# Patient Record
Sex: FEMALE | Race: WHITE
[De-identification: names, ages, dates, MRNs, and addresses within clinical notes are randomized per-mention and may not be internally consistent; named-entity substitution may affect disease eponyms.]

---

## 2018-07-06 ENCOUNTER — HOSPITAL ENCOUNTER (EMERGENCY)
Dept: HOSPITAL 47 - EC | Age: 33
Discharge: HOME | End: 2018-07-06
Payer: COMMERCIAL

## 2018-07-06 VITALS — DIASTOLIC BLOOD PRESSURE: 81 MMHG | SYSTOLIC BLOOD PRESSURE: 127 MMHG | TEMPERATURE: 98.7 F | HEART RATE: 81 BPM

## 2018-07-06 VITALS — RESPIRATION RATE: 18 BRPM

## 2018-07-06 DIAGNOSIS — Z88.0: ICD-10-CM

## 2018-07-06 DIAGNOSIS — R20.9: ICD-10-CM

## 2018-07-06 DIAGNOSIS — R07.89: Primary | ICD-10-CM

## 2018-07-06 DIAGNOSIS — Z88.1: ICD-10-CM

## 2018-07-06 DIAGNOSIS — Z82.49: ICD-10-CM

## 2018-07-06 LAB
ALBUMIN SERPL-MCNC: 4.3 G/DL (ref 3.5–5)
ALP SERPL-CCNC: 54 U/L (ref 38–126)
ALT SERPL-CCNC: 43 U/L (ref 9–52)
AMYLASE SERPL-CCNC: 51 U/L (ref 30–110)
ANION GAP SERPL CALC-SCNC: 11 MMOL/L
APTT BLD: 24.9 SEC (ref 22–30)
AST SERPL-CCNC: 22 U/L (ref 14–36)
BASOPHILS # BLD AUTO: 0 K/UL (ref 0–0.2)
BASOPHILS NFR BLD AUTO: 1 %
BUN SERPL-SCNC: 12 MG/DL (ref 7–17)
CALCIUM SPEC-MCNC: 9.5 MG/DL (ref 8.4–10.2)
CHLORIDE SERPL-SCNC: 105 MMOL/L (ref 98–107)
CO2 SERPL-SCNC: 25 MMOL/L (ref 22–30)
EOSINOPHIL # BLD AUTO: 0.1 K/UL (ref 0–0.7)
EOSINOPHIL NFR BLD AUTO: 2 %
ERYTHROCYTE [DISTWIDTH] IN BLOOD BY AUTOMATED COUNT: 5.22 M/UL (ref 3.8–5.4)
ERYTHROCYTE [DISTWIDTH] IN BLOOD: 12.7 % (ref 11.5–15.5)
GLUCOSE SERPL-MCNC: 96 MG/DL (ref 74–99)
HCT VFR BLD AUTO: 44.2 % (ref 34–46)
HGB BLD-MCNC: 14.9 GM/DL (ref 11.4–16)
INR PPP: 1.1 (ref ?–1.2)
LIPASE SERPL-CCNC: 59 U/L (ref 23–300)
LYMPHOCYTES # SPEC AUTO: 1.9 K/UL (ref 1–4.8)
LYMPHOCYTES NFR SPEC AUTO: 30 %
MAGNESIUM SPEC-SCNC: 2.2 MG/DL (ref 1.6–2.3)
MCH RBC QN AUTO: 28.6 PG (ref 25–35)
MCHC RBC AUTO-ENTMCNC: 33.7 G/DL (ref 31–37)
MCV RBC AUTO: 84.8 FL (ref 80–100)
MONOCYTES # BLD AUTO: 0.4 K/UL (ref 0–1)
MONOCYTES NFR BLD AUTO: 6 %
NEUTROPHILS # BLD AUTO: 3.8 K/UL (ref 1.3–7.7)
NEUTROPHILS NFR BLD AUTO: 60 %
PLATELET # BLD AUTO: 241 K/UL (ref 150–450)
POTASSIUM SERPL-SCNC: 4 MMOL/L (ref 3.5–5.1)
PROT SERPL-MCNC: 7 G/DL (ref 6.3–8.2)
PT BLD: 10.5 SEC (ref 9–12)
SODIUM SERPL-SCNC: 141 MMOL/L (ref 137–145)
WBC # BLD AUTO: 6.4 K/UL (ref 3.8–10.6)

## 2018-07-06 PROCEDURE — 99285 EMERGENCY DEPT VISIT HI MDM: CPT

## 2018-07-06 PROCEDURE — 85025 COMPLETE CBC W/AUTO DIFF WBC: CPT

## 2018-07-06 PROCEDURE — 93005 ELECTROCARDIOGRAM TRACING: CPT

## 2018-07-06 PROCEDURE — 83690 ASSAY OF LIPASE: CPT

## 2018-07-06 PROCEDURE — 71046 X-RAY EXAM CHEST 2 VIEWS: CPT

## 2018-07-06 PROCEDURE — 84484 ASSAY OF TROPONIN QUANT: CPT

## 2018-07-06 PROCEDURE — 83735 ASSAY OF MAGNESIUM: CPT

## 2018-07-06 PROCEDURE — 82150 ASSAY OF AMYLASE: CPT

## 2018-07-06 PROCEDURE — 85730 THROMBOPLASTIN TIME PARTIAL: CPT

## 2018-07-06 PROCEDURE — 36415 COLL VENOUS BLD VENIPUNCTURE: CPT

## 2018-07-06 PROCEDURE — 80053 COMPREHEN METABOLIC PANEL: CPT

## 2018-07-06 PROCEDURE — 85610 PROTHROMBIN TIME: CPT

## 2018-07-06 NOTE — ED
Chest Pain HPI





- General


Chief Complaint: Chest Pain


Stated Complaint: Chest pain


Time Seen by Provider: 07/06/18 21:39


Source: patient, RN notes reviewed


Mode of arrival: ambulatory


Limitations: no limitations





- History of Present Illness


Initial Comments: 


This is a 32-year-old female who presents to the emergency department with 

chief complaint of left upper chest pain.  Patient states that she developed 

chest pain at 11 AM this morning.  She states that it has been intermittent and 

sharp in nature.  She states that the pain came on while driving.  She denies 

shortness of breath, nausea or vomiting, abdominal pain, fevers or chills, 

cough.  Patient states that she became more concerned because she normally has 

a bowel movement every other day but had 2 bowel movements today.  She also 

reports feeling a strange sensation along the lateral aspect of her left leg.  

Patient is a nonsmoker.  She does not take birth control.  She denies any 

recent surgeries or hospitalizations.  She denies any significant medical 

history.  She does report a family history of cardiac disease, however denies 

any early onset of disease. 





- Related Data


 Home Medications











 Medication  Instructions  Recorded  Confirmed


 


Aspirin EC [Ecotrin Low Dose] 81 mg PO DAILY PRN 07/06/18 07/06/18


 


Ibuprofen [Motrin] 400 mg PO DAILY PRN 07/06/18 07/06/18


 


Loratadine [Claritin] 10 mg PO DAILY PRN 07/06/18 07/06/18











 Allergies











Allergy/AdvReac Type Severity Reaction Status Date / Time


 


cephalexin [From Keflex] Allergy  Rash/Hives Verified 07/06/18 22:35


 


Penicillins Allergy  Swelling Verified 07/06/18 22:35














Review of Systems


ROS Statement: 


Those systems with pertinent positive or pertinent negative responses have been 

documented in the HPI.





ROS Other: All systems not noted in ROS Statement are negative.





EKG Findings





- EKG Comments:


EKG Findings:: 21:38:49.  Normal sinus rhythm with sinus arrhythmia.  

Ventricular rate 81 bpm, CO interval 140, QRS duration 80, QT//446.  No 

evidence of ST segment elevation or depression.





Past Medical History


Past Medical History: No Reported History


History of Any Multi-Drug Resistant Organisms: None Reported


Past Surgical History: Cholecystectomy


Past Psychological History: No Psychological Hx Reported


Smoking Status: Never smoker


Past Alcohol Use History: Rare


Past Drug Use History: None Reported





General Exam


Limitations: no limitations





Course


 Vital Signs











  07/06/18 07/06/18





  21:19 21:53


 


Temperature 98.5 F 


 


Pulse Rate 87 


 


Pulse Rate [  76





Bilateral  





Sitting Radial]  


 


Respiratory 18 





Rate  


 


Blood Pressure 167/91 


 


O2 Sat by Pulse 100 





Oximetry  














Chest Pain MDM





- MDM


This is a 32-year-old female who presents to the emergency department with 

chief complaint of chest pain.  Patient describes the chest pain as located in 

the left upper area of her chest under her clavicle.  She states that it is 

intermittent and sharp in nature.  PERC is negative, low likelihood for DVT or 

pulmonary embolism.  Patient is a nonsmoker.  She denies early onset of cardiac 

disease in her family.  EKG revealed normal sinus rhythm.  Chest x-ray revealed 

no acute abnormalities.  CBC, CMP and coags were within normal limits.  

Troponin was negative.  The patient's vital signs are stable and she is in no 

acute distress.  She will be discharged home at this time.  Patient is to follow

-up with her primary care provider.  She is in agreement with plan and voices 

understanding.  All questions answered.





Chest x-ray impression: Normal chest.








Disposition


Clinical Impression: 


 Atypical chest pain





Disposition: HOME SELF-CARE


Condition: Good


Instructions:  Chest Pain (ED)


Additional Instructions: 


Please follow up with primary care provider within 1-2 days. Return to 

emergency department if symptoms should worsen or any concerns arise. 


Is patient prescribed a controlled substance at d/c from ED?: No


Referrals: 


Lucero Long MD [Primary Care Provider] - 1-2 days


Time of Disposition: 23:30

## 2018-07-06 NOTE — XR
EXAMINATION TYPE: XR chest 2V

 

DATE OF EXAM: 7/6/2018

 

COMPARISON: NONE

 

HISTORY: Chest pain

 

TECHNIQUE:  Frontal and lateral views of the chest are obtained.

 

FINDINGS:  Heart and mediastinum are normal. Lungs are clear. Diaphragm is normal. Bony thorax is int
act.

 

IMPRESSION:  Normal chest.

## 2022-10-29 ENCOUNTER — HOSPITAL ENCOUNTER (EMERGENCY)
Dept: HOSPITAL 47 - EC | Age: 37
Discharge: HOME | End: 2022-10-29
Payer: COMMERCIAL

## 2022-10-29 VITALS
DIASTOLIC BLOOD PRESSURE: 78 MMHG | HEART RATE: 78 BPM | SYSTOLIC BLOOD PRESSURE: 138 MMHG | TEMPERATURE: 98.6 F | RESPIRATION RATE: 18 BRPM

## 2022-10-29 DIAGNOSIS — Z88.0: ICD-10-CM

## 2022-10-29 DIAGNOSIS — Z88.1: ICD-10-CM

## 2022-10-29 DIAGNOSIS — U07.1: Primary | ICD-10-CM

## 2022-10-29 PROCEDURE — 99283 EMERGENCY DEPT VISIT LOW MDM: CPT

## 2022-10-29 PROCEDURE — 87636 SARSCOV2 & INF A&B AMP PRB: CPT

## 2022-10-29 NOTE — ED
General Adult HPI





- General


Chief complaint: Upper Respiratory Infection


Stated complaint: Sore throat, Cough, Covid exposure


Time Seen by Provider: 10/29/22 07:09


Source: patient


Mode of arrival: ambulatory


Limitations: no limitations





- History of Present Illness


Initial comments: 


Dictation was produced using dragon dictation software. please excuse any 

grammatical, word or spelling errors. 











Chief Complaint: 36-year-old female presents emergency Department requesting 

COVID-19 testing.





History of Present Illness: 36-year-old female presents emergency Department 

with 4 days of COVID-19 symptoms.  Her father was tested here in emergency 

department yesterday and was positive.  Patient states that her symptoms are 

very mild.  She is unvaccinated for COVID-19.  Patient states that she has 

symptoms of aches, congestion and nonproductive cough.








The ROS documented in this emergency department record has been reviewed and 

confirmed by me.  Those systems with pertinent positive or negative responses 

have been documented in the HPI.  All other systems are other negative and/or 

noncontributory.








PHYSICAL EXAM:


General Impression: Alert and oriented x3, not in acute distress


HEENT: Normocephalic atraumatic, extra-ocular movements intact, pupils equal and

reactive to light bilaterally, mucous membranes moist.


Cardiovascular: Heart regular rate and rhythm


Chest: Able to complete full sentences, no retractions, no tachypnea


Musculoskeletal:  no peripheral edema


Motor:  no focal deficits noted


Neurological: CN II-XII grossly intact, no focal motor or sensory deficits noted


Skin: Intact with no visualized rashes


Psych: Normal affect and mood





ED course: 36-year-old female presents emergency department for Covid 19 testing

.  Vital signs upon arrival are within acceptable limits.  Patient not showing 

signs of respiratory distress.  Patient not hypoxic.


Patient is COVID-19 positive.  Patient does not meet criteria for antiviral 

administration.  Patient be discharged.

















- Related Data


                                Home Medications











 Medication  Instructions  Recorded  Confirmed


 


Aspirin EC [Ecotrin Low Dose] 81 mg PO DAILY PRN 07/06/18 07/06/18


 


Ibuprofen [Motrin] 400 mg PO DAILY PRN 07/06/18 07/06/18


 


Loratadine [Claritin] 10 mg PO DAILY PRN 07/06/18 07/06/18











                                    Allergies











Allergy/AdvReac Type Severity Reaction Status Date / Time


 


cephalexin [From Keflex] Allergy  Rash/Hives Verified 10/29/22 07:21


 


Penicillins Allergy  Swelling Verified 10/29/22 07:21














Review of Systems


ROS Statement: 


Those systems with pertinent positive or pertinent negative responses have been 

documented in the HPI.





ROS Other: All systems not noted in ROS Statement are negative.





Past Medical History


Past Medical History: No Reported History


History of Any Multi-Drug Resistant Organisms: None Reported


Past Surgical History: Cholecystectomy


Past Psychological History: No Psychological Hx Reported


Past Alcohol Use History: Rare


Past Drug Use History: None Reported





General Exam


Limitations: no limitations





Course


                                   Vital Signs











  10/29/22





  07:21


 


Temperature 98.3 F


 


Pulse Rate 92


 


Respiratory 16





Rate 


 


Blood Pressure 140/99


 


O2 Sat by Pulse 99





Oximetry 














Medical Decision Making





- Lab Data


                                   Lab Results











  10/29/22 Range/Units





  07:25 


 


Influenza Type A (PCR)  Not Detected  (Not Detectd)  


 


Influenza Type B (PCR)  Not Detected  (Not Detectd)  


 


RSV (PCR)  Not Detected  (Not Detectd)  


 


SARS-CoV-2 (PCR)  Detected A  (Not Detectd)  














Disposition


Clinical Impression: 


 Coronavirus infection





Disposition: HOME SELF-CARE


Condition: Good


Instructions (If sedation given, give patient instructions):  Coronavirus 

Disease 2019 (COVID-19)


Is patient prescribed a controlled substance at d/c from ED?: No


Referrals: 


Lucero Long MD [Primary Care Provider] - 1-2 days


Time of Disposition: 09:06

## 2024-10-31 ENCOUNTER — HOSPITAL ENCOUNTER (EMERGENCY)
Dept: HOSPITAL 47 - EC | Age: 39
Discharge: HOME | End: 2024-10-31
Payer: COMMERCIAL

## 2024-10-31 VITALS — DIASTOLIC BLOOD PRESSURE: 90 MMHG | HEART RATE: 75 BPM | SYSTOLIC BLOOD PRESSURE: 128 MMHG | TEMPERATURE: 98.1 F

## 2024-10-31 VITALS — RESPIRATION RATE: 18 BRPM

## 2024-10-31 DIAGNOSIS — Z88.1: ICD-10-CM

## 2024-10-31 DIAGNOSIS — Z90.49: ICD-10-CM

## 2024-10-31 DIAGNOSIS — Z88.0: ICD-10-CM

## 2024-10-31 DIAGNOSIS — K59.00: Primary | ICD-10-CM

## 2024-10-31 LAB
ALBUMIN SERPL-MCNC: 4.6 G/DL (ref 3.5–5)
ALP SERPL-CCNC: 52 U/L (ref 38–126)
ALT SERPL-CCNC: 16 U/L (ref 4–34)
AMYLASE SERPL-CCNC: 57 U/L (ref 30–110)
ANION GAP SERPL CALC-SCNC: 8 MMOL/L
AST SERPL-CCNC: 21 U/L (ref 14–36)
BASOPHILS # BLD AUTO: 0.1 K/UL (ref 0–0.2)
BASOPHILS NFR BLD AUTO: 1 %
BUN SERPL-SCNC: 10 MG/DL (ref 7–17)
CALCIUM SPEC-MCNC: 9.7 MG/DL (ref 8.4–10.2)
CHLORIDE SERPL-SCNC: 108 MMOL/L (ref 98–107)
CO2 SERPL-SCNC: 23 MMOL/L (ref 22–30)
EOSINOPHIL # BLD AUTO: 0.1 K/UL (ref 0–0.7)
EOSINOPHIL NFR BLD AUTO: 2 %
ERYTHROCYTE [DISTWIDTH] IN BLOOD BY AUTOMATED COUNT: 5.16 M/UL (ref 3.8–5.4)
ERYTHROCYTE [DISTWIDTH] IN BLOOD: 12.5 % (ref 11.5–15.5)
GLUCOSE SERPL-MCNC: 95 MG/DL (ref 74–99)
HCT VFR BLD AUTO: 44.3 % (ref 34–46)
HGB BLD-MCNC: 14.4 GM/DL (ref 11.4–16)
LIPASE SERPL-CCNC: 81 U/L (ref 23–300)
LYMPHOCYTES # SPEC AUTO: 2.8 K/UL (ref 1–4.8)
LYMPHOCYTES NFR SPEC AUTO: 36 %
MCH RBC QN AUTO: 27.9 PG (ref 25–35)
MCHC RBC AUTO-ENTMCNC: 32.6 G/DL (ref 31–37)
MCV RBC AUTO: 85.8 FL (ref 80–100)
MONOCYTES # BLD AUTO: 0.4 K/UL (ref 0–1)
MONOCYTES NFR BLD AUTO: 6 %
NEUTROPHILS # BLD AUTO: 4.2 K/UL (ref 1.3–7.7)
NEUTROPHILS NFR BLD AUTO: 54 %
PH UR: 5.5 [PH] (ref 5–8)
PLATELET # BLD AUTO: 271 K/UL (ref 150–450)
POTASSIUM SERPL-SCNC: 3.9 MMOL/L (ref 3.5–5.1)
PROT SERPL-MCNC: 7.5 G/DL (ref 6.3–8.2)
SODIUM SERPL-SCNC: 139 MMOL/L (ref 137–145)
SP GR UR: 1.02 (ref 1–1.03)
UROBILINOGEN UR QL STRIP: <2 MG/DL (ref ?–2)
WBC # BLD AUTO: 7.7 K/UL (ref 3.8–10.6)

## 2024-10-31 PROCEDURE — 85025 COMPLETE CBC W/AUTO DIFF WBC: CPT

## 2024-10-31 PROCEDURE — 83605 ASSAY OF LACTIC ACID: CPT

## 2024-10-31 PROCEDURE — 80053 COMPREHEN METABOLIC PANEL: CPT

## 2024-10-31 PROCEDURE — 93005 ELECTROCARDIOGRAM TRACING: CPT

## 2024-10-31 PROCEDURE — 81025 URINE PREGNANCY TEST: CPT

## 2024-10-31 PROCEDURE — 36415 COLL VENOUS BLD VENIPUNCTURE: CPT

## 2024-10-31 PROCEDURE — 74018 RADEX ABDOMEN 1 VIEW: CPT

## 2024-10-31 PROCEDURE — 99284 EMERGENCY DEPT VISIT MOD MDM: CPT

## 2024-10-31 PROCEDURE — 81003 URINALYSIS AUTO W/O SCOPE: CPT

## 2024-10-31 PROCEDURE — 82150 ASSAY OF AMYLASE: CPT

## 2024-10-31 PROCEDURE — 83690 ASSAY OF LIPASE: CPT

## 2024-10-31 RX ADMIN — MAGESIUM CITRATE ONE ML: 1.75 LIQUID ORAL at 23:12

## 2024-10-31 NOTE — XR
EXAMINATION TYPE: XR KUB

 

DATE OF EXAM: 10/31/2024 10:21 PM

 

CLINICAL HISTORY:  Abdominal pain. Alternating constipation and diarrhea for 3 weeks.

 

TECHNIQUE:  Two Upright KUB images of the abdomen are obtained.

 

COMPARISON: None.

 

FINDINGS: Scattered gas is seen in non-distended small and large bowel loops. Suggestion of some loss
 of normal haustration involving the left colon. Cholecystectomy clips are seen. Displaced clips late
ral right mid abdomen is noted. No free air is identified. Left-sided pelvic phlebolith is seen. Osse
ous structures are intact. Lung bases are clear.

 

IMPRESSION: 

Overall nonobstructive bowel gas pattern. Possible left-sided colitis.

 

[facility], Workstation: [id number], 10/31/2024 10:26 PM

## 2024-10-31 NOTE — ED
Abdominal Pain HPI





- General


Source: patient, RN notes reviewed


Mode of arrival: ambulatory


Limitations: no limitations





- History of Present Illness


MD Complaint: abdominal pain, flank pain


Onset/Timin


-: hour(s)


Location: RUQ, R flank


Radiation: back


Severity scale (1-10): 7


Consistency: intermittent


Associated Symptoms: diarrhea, constipation, other (Pale stool yesterday)


Treatments Prior to Arrival: other (Dicyclomine)





<Madhu Linton - Last Filed: 10/31/24 19:35>





<Charlie Silverman - Last Filed: 24 02:12>





- General


Chief Complaint: Abdominal Pain


Stated Complaint: abd pain/constipation


Time Seen by Provider: 10/31/24 18:22





- History of Present Illness


Initial Comments: 





Quick note: This is a 38-year-old female presenting with abdominal pain x 5 

hours.  Patient states pain is intermittent and described as burning/pressure, 

especially in the right upper quadrant radiating to her right flank and back.  

Patient endorses long history of GI symptoms with most recent bout of 

constipation/diarrhea for the past 3 weeks.  Patient endorses working diagnosis 

of IBS.  Patient endorses history of cholecystectomy in .  Patient states 

her stool yesterday was slightly more pale in appearance.  Patient denies 

history of gallstones or kidney stones.  Patient denies fever, chills, chest 

pain, dyspnea, urinary symptoms, nausea/vomiting, dizziness, hematochezia, 

melena, hematuria. (Madhu Linton)





Agree with above, patient has been having milder symptoms going back between 1 

and 2 weeks.  She states that the pain became more noticeable yesterday and she 

went to urgent care where they checked her urine, she has paperwork showing no 

findings in the urine.  Patient was given dicyclomine 20 mg to take for the 

pains and she states that it really did not seem to do much. (Charlie Silverman)





- Related Data


                                Home Medications











 Medication  Instructions  Recorded  Confirmed


 


Aspirin EC [Ecotrin Low Dose] 81 mg PO DAILY PRN 18


 


Ibuprofen [Motrin] 400 mg PO DAILY PRN 18


 


Loratadine [Claritin] 10 mg PO DAILY PRN 18











                                    Allergies











Allergy/AdvReac Type Severity Reaction Status Date / Time


 


cephalexin [From Keflex] Allergy  Rash/Hives Verified 10/31/24 18:22


 


Penicillins Allergy  Swelling Verified 10/31/24 18:22


 


xylitol Allergy  Unknown Verified 10/31/24 18:23














Review of Systems


ROS Other: All systems not noted in ROS Statement are negative.





<Madhu Linton - Last Filed: 10/31/24 19:35>


ROS Other: All systems not noted in ROS Statement are negative.


Constitutional: Denies: fever, chills


Respiratory: Denies: cough, dyspnea


Cardiovascular: Denies: chest pain, palpitations, edema


Gastrointestinal: Reports: as per HPI, abdominal pain, constipation.  Denies: 

nausea, vomiting, diarrhea, melena, hematochezia


Genitourinary: Denies: dysuria, frequency, hematuria, discharge, abnormal menses


Musculoskeletal: Denies: back pain


Skin: Denies: rash


Neurological: Denies: headache, weakness, numbness





<HerreraCharlie - Last Filed: 24 02:12>


ROS Statement: 


Those systems with pertinent positive or pertinent negative responses have been 

documented in the HPI.








Past Medical History


Past Medical History: No Reported History


Additional Past Medical History / Comment(s): IBS


History of Any Multi-Drug Resistant Organisms: None Reported


Past Surgical History: Cholecystectomy


Past Psychological History: No Psychological Hx Reported


Smoking Status: Never smoker


Past Alcohol Use History: Rare


Past Drug Use History: None Reported





<Madhu Linton - Last Filed: 10/31/24 19:35>





General Exam


Limitations: no limitations





<Madhu Linton - Last Filed: 10/31/24 19:35>


Limitations: no limitations


General appearance: alert, in no apparent distress


Head exam: Present: atraumatic, normocephalic


Eye exam: Present: normal appearance.  Absent: scleral icterus, conjunctival 

injection


ENT exam: Present: normal oropharynx


Neck exam: Present: normal inspection.  Absent: tenderness, full ROM


Respiratory exam: Present: normal lung sounds bilaterally.  Absent: respiratory 

distress, wheezes, rales, rhonchi, stridor, accessory muscle use


Cardiovascular Exam: Present: regular rate, normal rhythm, normal heart sounds. 

Absent: systolic murmur, diastolic murmur, rubs, gallop


GI/Abdominal exam: Present: soft.  Absent: distended, tenderness, guarding, 

rebound, rigid, mass, pulsatile mass, hernia


Extremities exam: Present: normal inspection, normal capillary refill.  Absent: 

pedal edema, calf tenderness


Back exam: Present: normal inspection.  Absent: CVA tenderness (R), CVA 

tenderness (L)


Neurological exam: Present: alert


Skin exam: Present: warm, dry, intact, normal color.  Absent: rash





<Charlie Silverman - Last Filed: 24 02:12>





- General Exam Comments


Initial Comments: 





Visual Physical Exam





Vital signs reviewed





General: Well-appearing, nontoxic, no acute distress.


Head: Normocephalic, atraumatic


Eyes: PERRLA, EOMI


ENT: Airway patent


Chest: Nonlabored breathing


Skin: No visual rash, normal skin tone


Neuro: Alert and oriented 3


Musculoskeletal: No gross abnormalities


 (Madhu Linton)





Course


                                   Vital Signs











  10/31/24 10/31/24





  18:23 23:16


 


Temperature 98.5 F 98.1 F


 


Pulse Rate 91 75


 


Respiratory 18 18





Rate  


 


Blood Pressure 154/97 128/90


 


O2 Sat by Pulse 100 98





Oximetry  














Medical Decision Making





<Madhu Linton - Last Filed: 10/31/24 19:35>





- Lab Data


Result diagrams: 


                                 10/31/24 20:09





                                 10/31/24 20:09





- EKG Data


-: EKG Interpreted by Me


EKG shows normal: sinus rhythm (With sinus arrhythmia), axis (Normal), intervals

(Normal), QRS complexes (Normal), ST-T waves (Normal)


Rate: normal (Rate 76 bpm)


Interpretation: normal EKG





<Charlie Silverman - Last Filed: 24 02:12>





- Medical Decision Making





I completed the quick note portion of this chart signed MANJULA Dela Cruz (aMdhu Linton)





The patient had KUB x-ray that I interpreted as negative for free air or 

obstruction.  There may be element of localized constipation.





Was pt. sent in by a medical professional or institution (ANISHA Woodard, NP, urgent 

care, hospital, or nursing home...) When possible be specific


@  -[No]


Did you speak to anyone other than the patient for history (EMS, parent, family,

 police, friend...)? What history was obtained from this source 


@  -[No]


Did you review nursing and triage notes (agree or disagree)?  Why? 


@  -[I reviewed and agree with nursing and triage notes]


Were old charts reviewed (outside hosp., previous admission, EMS record, old 

EKG, old radiological studies, urgent care reports/EKG's, nursing home records)?

Report findings 


@  -[No old charts were reviewed]


Differential Diagnosis (chest pain, altered mental status, abdominal pain women,

abdominal pain men, vaginal bleeding, weakness, fever, dyspnea, syncope, 

headache, dizziness, GI bleed, back pain, seizure, CVA, palpatations, mental 

health, musculoskeletal)? 


@  -[Differential Abdominal Pain Women:


Appendicitis, Cholecystitis, diverticulosis, ischemic bowel, pancreatitis, 

hepatitis, UTI, gastroenteritis, AAA, incarcerated hernia, bowel obstruction, 

constipation, inflammatory bowel, hepatitis, peptic ulcer disease, splenic 

infarction, perforated viscus, vulvitis, ovarian torsion, PID, kidney stone, 

placenta abruption, this is not meant to be an all-inclusive list





EKG interpreted by me (3pts min.).


@  -[As above]


X-rays interpreted by me (1pt min.).


@  -[I interpreted as above


CT interpreted by me (1pt min.).


@  -[None done]


U/S interpreted by me (1pt. min.).


@  -[None done]


What testing was considered but not performed or refused? (CT, X-rays, U/S, 

labs)? Why?


@  -[None]


What meds were considered but not given or refused? Why?


@  -[None]


Did you discuss the management of the patient with other professionals 

(professionals i.e. , PA, NP, lab, RT, psych nurse, , , 

teacher, , )? Give summary


@  -[No]


Was smoking cessation discussed for >3mins.?


@  -[No]


Was critical care preformed (if so, how long)?


@  -[No]


Were there social determinants of health that impacted care today? How? (Ho

melessness, low income, unemployed, alcoholism, drug addiction, transportation, 

low edu. Level, literacy, decrease access to med. care, senior living, rehab)?


@  -[No]


Was there de-escalation of care discussed even if they declined (Discuss DNR or 

withdrawal of care, Hospice)? DNR status


@  -[No]


What co-morbidities impacted this encounter? (DM, HTN, Smoking, COPD, CAD, 

Cancer, CVA, ARF, Chemo, Hep., AIDS, mental health diagnosis, sleep apnea, 

morbid obesity)?


@  -[None]


Was patient admitted / discharged? Hospital course, mention meds given and 

route, prescriptions, significant lab abnormalities, going to OR and other 

pertinent info.


@  -[Patient is a 38-year-old woman presenting with abdominal pain.  On exam 

there is no findings suggestive of acute surgical condition.  Discussed with 

patient that there may be element of localized constipation based on the x-ray. 

 We discussed appropriate further care and follow-up as well as return 

parameters patient understands that if she is not feeling much better within 12 

hours she should have repeat evaluation, sooner if there is any worsening.


Undiagnosed new problem with uncertain prognosis?


@  -[No]


Drug Therapy requiring intensive monitoring for toxicity (Heparin, Nitro, 

Insulin, Cardizem)?


@  -[No]


Were any procedures done?


@  -[No]


Diagnosis/symptom?


@  -[Acute abdominal pain, suspect constipation


Acute, or Chronic, or Acute on Chronic?


@  -[Acute


Uncomplicated (without systemic symptoms) or Complicated (systemic symptoms)?


@  -[Uncomplicated


Side effects of treatment?


@  -[No]


Exacerbation, Progression, or Severe Exacerbation?


@  -[No]


Poses a threat to life or bodily function? How? (Chest pain, USA, MI, pneumonia,

 PE, COPD, DKA, ARF, appy, cholecystitis, CVA, Diverticulitis, Homicidal, 

Suicidal, threat to staff... and all critical care pts)


@  -[No]





 (Charlie Silverman)





- Lab Data


                                   Lab Results











  10/31/24 10/31/24 10/31/24 Range/Units





  20:09 20:09 20:09 


 


WBC  7.7    (3.8-10.6)  k/uL


 


RBC  5.16    (3.80-5.40)  m/uL


 


Hgb  14.4    (11.4-16.0)  gm/dL


 


Hct  44.3    (34.0-46.0)  %


 


MCV  85.8    (80.0-100.0)  fL


 


MCH  27.9    (25.0-35.0)  pg


 


MCHC  32.6    (31.0-37.0)  g/dL


 


RDW  12.5    (11.5-15.5)  %


 


Plt Count  271    (150-450)  k/uL


 


MPV  7.7    


 


Neutrophils %  54    %


 


Lymphocytes %  36    %


 


Monocytes %  6    %


 


Eosinophils %  2    %


 


Basophils %  1    %


 


Neutrophils #  4.2    (1.3-7.7)  k/uL


 


Lymphocytes #  2.8    (1.0-4.8)  k/uL


 


Monocytes #  0.4    (0-1.0)  k/uL


 


Eosinophils #  0.1    (0-0.7)  k/uL


 


Basophils #  0.1    (0-0.2)  k/uL


 


Sodium   139   (137-145)  mmol/L


 


Potassium   3.9   (3.5-5.1)  mmol/L


 


Chloride   108 H   ()  mmol/L


 


Carbon Dioxide   23   (22-30)  mmol/L


 


Anion Gap   8   mmol/L


 


BUN   10   (7-17)  mg/dL


 


Creatinine   0.72   (0.52-1.04)  mg/dL


 


Est GFR (CKD-EPI)AfAm   >90   (>60 ml/min/1.73 sqM)  


 


Est GFR (CKD-EPI)NonAf   >90   (>60 ml/min/1.73 sqM)  


 


Glucose   95   (74-99)  mg/dL


 


Plasma Lactic Acid Amilcar    0.6 L  (0.7-2.0)  mmol/L


 


Calcium   9.7   (8.4-10.2)  mg/dL


 


Total Bilirubin   0.4   (0.2-1.3)  mg/dL


 


AST   21   (14-36)  U/L


 


ALT   16   (4-34)  U/L


 


Alkaline Phosphatase   52   ()  U/L


 


Total Protein   7.5   (6.3-8.2)  g/dL


 


Albumin   4.6   (3.5-5.0)  g/dL


 


Amylase   57   ()  U/L


 


Lipase   81   ()  U/L


 


Urine Color     


 


Urine Appearance     (Clear)  


 


Urine pH     (5.0-8.0)  


 


Ur Specific Gravity     (1.001-1.035)  


 


Urine Protein     (Negative)  


 


Urine Glucose (UA)     (Negative)  


 


Urine Ketones     (Negative)  


 


Urine Blood     (Negative)  


 


Urine Nitrite     (Negative)  


 


Urine Bilirubin     (Negative)  


 


Urine Urobilinogen     (<2.0)  mg/dL


 


Ur Leukocyte Esterase     (Negative)  


 


Urine HCG, Qual     (Not Detectd)  














  10/31/24 10/31/24 Range/Units





  22:06 22:06 


 


WBC    (3.8-10.6)  k/uL


 


RBC    (3.80-5.40)  m/uL


 


Hgb    (11.4-16.0)  gm/dL


 


Hct    (34.0-46.0)  %


 


MCV    (80.0-100.0)  fL


 


MCH    (25.0-35.0)  pg


 


MCHC    (31.0-37.0)  g/dL


 


RDW    (11.5-15.5)  %


 


Plt Count    (150-450)  k/uL


 


MPV    


 


Neutrophils %    %


 


Lymphocytes %    %


 


Monocytes %    %


 


Eosinophils %    %


 


Basophils %    %


 


Neutrophils #    (1.3-7.7)  k/uL


 


Lymphocytes #    (1.0-4.8)  k/uL


 


Monocytes #    (0-1.0)  k/uL


 


Eosinophils #    (0-0.7)  k/uL


 


Basophils #    (0-0.2)  k/uL


 


Sodium    (137-145)  mmol/L


 


Potassium    (3.5-5.1)  mmol/L


 


Chloride    ()  mmol/L


 


Carbon Dioxide    (22-30)  mmol/L


 


Anion Gap    mmol/L


 


BUN    (7-17)  mg/dL


 


Creatinine    (0.52-1.04)  mg/dL


 


Est GFR (CKD-EPI)AfAm    (>60 ml/min/1.73 sqM)  


 


Est GFR (CKD-EPI)NonAf    (>60 ml/min/1.73 sqM)  


 


Glucose    (74-99)  mg/dL


 


Plasma Lactic Acid Amilcar    (0.7-2.0)  mmol/L


 


Calcium    (8.4-10.2)  mg/dL


 


Total Bilirubin    (0.2-1.3)  mg/dL


 


AST    (14-36)  U/L


 


ALT    (4-34)  U/L


 


Alkaline Phosphatase    ()  U/L


 


Total Protein    (6.3-8.2)  g/dL


 


Albumin    (3.5-5.0)  g/dL


 


Amylase    ()  U/L


 


Lipase    ()  U/L


 


Urine Color   Colorless  


 


Urine Appearance   Clear  (Clear)  


 


Urine pH   5.5  (5.0-8.0)  


 


Ur Specific Gravity   1.018  (1.001-1.035)  


 


Urine Protein   Negative  (Negative)  


 


Urine Glucose (UA)   Negative  (Negative)  


 


Urine Ketones   Negative  (Negative)  


 


Urine Blood   Negative  (Negative)  


 


Urine Nitrite   Negative  (Negative)  


 


Urine Bilirubin   Negative  (Negative)  


 


Urine Urobilinogen   <2.0  (<2.0)  mg/dL


 


Ur Leukocyte Esterase   Negative  (Negative)  


 


Urine HCG, Qual  Not Detected   (Not Detectd)  














Disposition





<Madhu Linton - Last Filed: 10/31/24 19:35>


Is patient prescribed a controlled substance at d/c from ED?: No





<Charlie Silverman - Last Filed: 24 02:12>


Clinical Impression: 


 Constipation





Disposition: HOME SELF-CARE


Condition: Good


Instructions (If sedation given, give patient instructions):  Constipation (ED)


Referrals: 


Sejal Bowden MD [Primary Care Provider] - 1-2 days


Kay Canada MD [STAFF PHYSICIAN] - 1-2 days

## 2025-04-11 ENCOUNTER — HOSPITAL ENCOUNTER (OUTPATIENT)
Dept: HOSPITAL 47 - LABWHC1 | Age: 40
Discharge: HOME | End: 2025-04-11
Attending: INTERNAL MEDICINE
Payer: COMMERCIAL

## 2025-04-11 ENCOUNTER — HOSPITAL ENCOUNTER (OUTPATIENT)
Dept: HOSPITAL 47 - RADXRMAIN | Age: 40
Discharge: HOME | End: 2025-04-11
Attending: INTERNAL MEDICINE
Payer: COMMERCIAL

## 2025-04-11 DIAGNOSIS — R19.4: Primary | ICD-10-CM

## 2025-04-11 LAB
ALBUMIN SERPL-MCNC: 4.4 G/DL (ref 3.8–4.9)
ALBUMIN/GLOB SERPL: 1.63 RATIO (ref 1.6–3.17)
ALP SERPL-CCNC: 72 U/L (ref 41–126)
ALT SERPL-CCNC: 18 U/L (ref 8–44)
AST SERPL-CCNC: 18 U/L (ref 13–35)
BASOPHILS # BLD AUTO: 0.06 X 10*3/UL (ref 0–0.1)
BASOPHILS NFR BLD AUTO: 0.9 %
BUN SERPL-SCNC: 6.2 MG/DL (ref 9–27)
BUN/CREAT SERPL: 8.86 RATIO (ref 12–20)
CALCIUM SPEC-MCNC: 9.5 MG/DL (ref 8.7–10.3)
CHLORIDE SERPL-SCNC: 106 MMOL/L (ref 96–109)
CO2 SERPL-SCNC: 24.6 MMOL/L (ref 21.6–31.8)
EOSINOPHIL # BLD AUTO: 0.07 X 10*3/UL (ref 0.04–0.35)
ERYTHROCYTE [DISTWIDTH] IN BLOOD: 12.2 % (ref 11.5–14.5)
GLOBULIN SER CALC-MCNC: 2.7 G/DL (ref 1.6–3.3)
GLUCOSE SERPL-MCNC: 91 MG/DL (ref 70–110)
HCT VFR BLD AUTO: 43.4 % (ref 37.2–46.3)
HGB BLD-MCNC: 14.2 G/DL (ref 12–15)
LYMPHOCYTES # SPEC AUTO: 1.93 X 10*3/UL (ref 0.9–5)
LYMPHOCYTES NFR SPEC AUTO: 27.5 %
MCH RBC QN AUTO: 27.8 PG (ref 27–32)
MCHC RBC AUTO-ENTMCNC: 32.7 G/DL (ref 32–37)
MCV RBC AUTO: 85.1 FL (ref 80–97)
MONOCYTES # BLD AUTO: 0.48 X 10*3/UL (ref 0.2–1)
MONOCYTES NFR BLD AUTO: 6.8 %
NEUTROPHILS # BLD AUTO: 4.46 X 10*3/UL (ref 1.8–7.7)
NEUTROPHILS NFR BLD AUTO: 63.5 %
NRBC BLD AUTO-RTO: 0 X 10*3/UL (ref 0–0.01)
PLATELET # BLD AUTO: 320 X 10*3/UL (ref 140–440)
POTASSIUM SERPL-SCNC: 3.6 MMOL/L (ref 3.5–5.5)
PROT SERPL-MCNC: 7.1 G/DL (ref 6.2–8.2)
SODIUM SERPL-SCNC: 144 MMOL/L (ref 135–145)
WBC # BLD AUTO: 7.02 X 10*3/UL (ref 4.5–10)

## 2025-04-11 PROCEDURE — 74019 RADEX ABDOMEN 2 VIEWS: CPT

## 2025-04-11 PROCEDURE — 83516 IMMUNOASSAY NONANTIBODY: CPT

## 2025-04-11 PROCEDURE — 36415 COLL VENOUS BLD VENIPUNCTURE: CPT

## 2025-04-11 PROCEDURE — 85025 COMPLETE CBC W/AUTO DIFF WBC: CPT

## 2025-04-11 PROCEDURE — 80053 COMPREHEN METABOLIC PANEL: CPT

## 2025-04-13 ENCOUNTER — HOSPITAL ENCOUNTER (EMERGENCY)
Dept: HOSPITAL 47 - EC | Age: 40
LOS: 1 days | Discharge: HOME | End: 2025-04-14
Payer: COMMERCIAL

## 2025-04-13 VITALS — TEMPERATURE: 98.3 F

## 2025-04-13 DIAGNOSIS — Z88.1: ICD-10-CM

## 2025-04-13 DIAGNOSIS — Z88.0: ICD-10-CM

## 2025-04-13 DIAGNOSIS — Z88.8: ICD-10-CM

## 2025-04-13 DIAGNOSIS — Z87.19: Primary | ICD-10-CM

## 2025-04-13 PROCEDURE — 82272 OCCULT BLD FECES 1-3 TESTS: CPT

## 2025-04-13 PROCEDURE — 99284 EMERGENCY DEPT VISIT MOD MDM: CPT

## 2025-04-13 PROCEDURE — 83690 ASSAY OF LIPASE: CPT

## 2025-04-13 PROCEDURE — 81003 URINALYSIS AUTO W/O SCOPE: CPT

## 2025-04-13 PROCEDURE — 85025 COMPLETE CBC W/AUTO DIFF WBC: CPT

## 2025-04-13 PROCEDURE — 84484 ASSAY OF TROPONIN QUANT: CPT

## 2025-04-13 PROCEDURE — 85730 THROMBOPLASTIN TIME PARTIAL: CPT

## 2025-04-13 PROCEDURE — 80053 COMPREHEN METABOLIC PANEL: CPT

## 2025-04-13 PROCEDURE — 36415 COLL VENOUS BLD VENIPUNCTURE: CPT

## 2025-04-14 VITALS — SYSTOLIC BLOOD PRESSURE: 132 MMHG | HEART RATE: 70 BPM | DIASTOLIC BLOOD PRESSURE: 99 MMHG

## 2025-04-14 VITALS — RESPIRATION RATE: 18 BRPM

## 2025-04-14 LAB
ALBUMIN SERPL-MCNC: 4.3 G/DL (ref 3.5–5)
ALP SERPL-CCNC: 58 U/L (ref 38–126)
ALT SERPL-CCNC: 21 U/L (ref 4–34)
ANION GAP SERPL CALC-SCNC: 11 MMOL/L
AST SERPL-CCNC: 22 U/L (ref 14–36)
BASOPHILS # BLD AUTO: 0.08 10*3/UL (ref 0–0.1)
BASOPHILS NFR BLD AUTO: 1.2 %
BUN SERPL-SCNC: 10 MG/DL (ref 7–17)
CALCIUM SPEC-MCNC: 9.2 MG/DL (ref 8.4–10.2)
CHLORIDE SERPL-SCNC: 105 MMOL/L (ref 98–107)
CO2 SERPL-SCNC: 21 MMOL/L (ref 22–30)
EOSINOPHIL NFR BLD AUTO: 3.1 %
ERYTHROCYTE [DISTWIDTH] IN BLOOD: 12.3 % (ref 11.5–14.5)
GLUCOSE SERPL-MCNC: 95 MG/DL (ref 74–99)
HCT VFR BLD AUTO: 39.7 % (ref 37.2–46.3)
HGB BLD-MCNC: 13.7 G/DL (ref 12–15)
LIPASE SERPL-CCNC: 87 U/L (ref 23–300)
LYMPHOCYTES # SPEC AUTO: 2.31 10*3/UL (ref 0.9–5)
LYMPHOCYTES NFR SPEC AUTO: 35.9 %
MCH RBC QN AUTO: 28.5 PG (ref 27–32)
MCHC RBC AUTO-ENTMCNC: 34.5 G/DL (ref 32–37)
MCV RBC AUTO: 82.7 FL (ref 80–97)
MONOCYTES # BLD AUTO: 0.52 10*3/UL (ref 0.2–1)
MONOCYTES NFR BLD AUTO: 8.1 %
NEUTROPHILS NFR BLD AUTO: 51.2 %
PLATELET # BLD AUTO: 303 10*3/UL (ref 140–440)
POTASSIUM SERPL-SCNC: 3.5 MMOL/L (ref 3.5–5.1)
PROT SERPL-MCNC: 6.9 G/DL (ref 6.3–8.2)
SODIUM SERPL-SCNC: 137 MMOL/L (ref 137–145)
SP GR UR: 1.04 (ref 1–1.03)
UROBILINOGEN UR QL STRIP: <2 MG/DL (ref ?–2)
WBC # BLD AUTO: 6.44 10*3/UL (ref 4.5–10)